# Patient Record
Sex: FEMALE | Race: WHITE | NOT HISPANIC OR LATINO | URBAN - METROPOLITAN AREA
[De-identification: names, ages, dates, MRNs, and addresses within clinical notes are randomized per-mention and may not be internally consistent; named-entity substitution may affect disease eponyms.]

---

## 2023-11-15 ENCOUNTER — OFFICE (OUTPATIENT)
Dept: URBAN - METROPOLITAN AREA CLINIC 16 | Facility: CLINIC | Age: 33
End: 2023-11-15

## 2023-11-15 VITALS
HEIGHT: 66 IN | WEIGHT: 173 LBS | OXYGEN SATURATION: 98 % | DIASTOLIC BLOOD PRESSURE: 58 MMHG | HEART RATE: 85 BPM | SYSTOLIC BLOOD PRESSURE: 116 MMHG

## 2023-11-15 DIAGNOSIS — K21.9 GASTRO-ESOPHAGEAL REFLUX DISEASE WITHOUT ESOPHAGITIS: ICD-10-CM

## 2023-11-15 DIAGNOSIS — R10.13 EPIGASTRIC PAIN: ICD-10-CM

## 2023-11-15 PROCEDURE — 99204 OFFICE O/P NEW MOD 45 MIN: CPT | Performed by: INTERNAL MEDICINE

## 2023-11-15 NOTE — SERVICEHPINOTES
Poonam Moran   is a   33   year old   female   patient who is seen   at the request of   Preethi De Leon   for a consultation/initial visit.  She is only 33 years old but had significant issues with dyspepsia more so back in the end of August since the beginning of September with discomfort in her abdomen along with reflux and even some nausea at that time. She is doing better overall and has been on medications with PPI. She will continue omeprazole but she was sent for EGD.  She is doing better now and she will remain on PPI and if she does the medication long-term she should be on multivitamin as well if she has recurrent symptoms I will have her pursue EGD investigation

## 2023-11-15 NOTE — SERVICENOTES
She is doing better now and she will remain on PPI and if she does the medication long-term she should be on multivitamin as well if she has recurrent symptoms I will have her pursue EGD investigation